# Patient Record
Sex: FEMALE | Race: ASIAN | URBAN - METROPOLITAN AREA
[De-identification: names, ages, dates, MRNs, and addresses within clinical notes are randomized per-mention and may not be internally consistent; named-entity substitution may affect disease eponyms.]

---

## 2018-02-06 ENCOUNTER — OFFICE VISIT (OUTPATIENT)
Dept: URGENT CARE | Facility: URGENT CARE | Age: 78
End: 2018-02-06
Payer: MEDICARE

## 2018-02-06 VITALS
OXYGEN SATURATION: 97 % | SYSTOLIC BLOOD PRESSURE: 130 MMHG | HEART RATE: 69 BPM | DIASTOLIC BLOOD PRESSURE: 68 MMHG | WEIGHT: 121.44 LBS | TEMPERATURE: 97.1 F

## 2018-02-06 DIAGNOSIS — J06.9 VIRAL URI WITH COUGH: Primary | ICD-10-CM

## 2018-02-06 PROCEDURE — 99203 OFFICE O/P NEW LOW 30 MIN: CPT | Performed by: PHYSICIAN ASSISTANT

## 2018-02-06 RX ORDER — LORATADINE 10 MG/1
10 TABLET ORAL DAILY
COMMUNITY

## 2018-02-06 RX ORDER — ESTRADIOL 1 MG/1
1 TABLET ORAL DAILY
COMMUNITY

## 2018-02-06 RX ORDER — CARVEDILOL 25 MG/1
25 TABLET ORAL 2 TIMES DAILY WITH MEALS
COMMUNITY

## 2018-02-06 RX ORDER — HYDROCHLOROTHIAZIDE 12.5 MG/1
25 TABLET ORAL DAILY
COMMUNITY

## 2018-02-06 RX ORDER — DEXLANSOPRAZOLE 60 MG/1
60 CAPSULE, DELAYED RELEASE ORAL DAILY
COMMUNITY

## 2018-02-06 RX ORDER — NITROGLYCERIN 0.4 MG/1
0.4 TABLET SUBLINGUAL EVERY 5 MIN PRN
COMMUNITY

## 2018-02-06 RX ORDER — LEVOTHYROXINE SODIUM 75 UG/1
75 TABLET ORAL DAILY
COMMUNITY

## 2018-02-06 RX ORDER — CODEINE PHOSPHATE AND GUAIFENESIN 10; 100 MG/5ML; MG/5ML
1-2 SOLUTION ORAL EVERY 4 HOURS PRN
Qty: 240 ML | Refills: 0 | Status: SHIPPED | OUTPATIENT
Start: 2018-02-06

## 2018-02-06 RX ORDER — AMLODIPINE BESYLATE 5 MG/1
5 TABLET ORAL DAILY
COMMUNITY

## 2018-02-06 NOTE — MR AVS SNAPSHOT
"              After Visit Summary   2/6/2018    Luz De Souza    MRN: [id number]           Patient Information     Date Of Birth          1940        Visit Information        Provider Department      2/6/2018 3:00 PM Corry Hunt PA-C [facility]        Today's Diagnoses     Viral URI with cough    -  1      Care Instructions    (J06.9,  B97.89) Viral URI with cough  (primary encounter diagnosis)  Comment:   Plan: guaiFENesin-codeine (ROBITUSSIN AC) 100-10         MG/5ML SOLN solution          Rest  Saline nasal spray  Follow up should symptoms persist or worsen.                Follow-ups after your visit        Who to contact     If you have questions or need follow up information about today's clinic visit or your schedule please contact [facility] directly at [phone].  Normal or non-critical lab and imaging results will be communicated to you by MyChart, letter or phone within 4 business days after the clinic has received the results. If you do not hear from us within 7 days, please contact the clinic through MyChart or phone. If you have a critical or abnormal lab result, we will notify you by phone as soon as possible.  Submit refill requests through [facility] or call your pharmacy and they will forward the refill request to us. Please allow 3 business days for your refill to be completed.          Additional Information About Your Visit        MyChart Information     [facility] lets you send messages to your doctor, view your test results, renew your prescriptions, schedule appointments and more. To sign up, go to [email or web address] . Click on \"Log in\" on the left side of the screen, which will take you to the Welcome page. Then click on \"Sign up Now\" on the right side of the page.     You will be asked to enter the access code listed below, as well as some personal information. Please follow the directions to create your username " and password.     Your access code is: [email or web address]  Expires: 2018  3:36 PM     Your access code will  in 90 days. If you need help or a new code, please call your [facility] clinic or [phone].        Care EveryWhere ID     This is your Care EveryWhere ID. This could be used by other organizations to access your [facility] medical records  [id number]        Your Vitals Were     Pulse Temperature Pulse Oximetry             69 97.1  F (36.2  C) (Oral) 97%          Blood Pressure from Last 3 Encounters:   18 130/68    Weight from Last 3 Encounters:   18 121 lb 7 oz (55.1 kg)              Today, you had the following     No orders found for display         Today's Medication Changes          These changes are accurate as of 18  3:36 PM.  If you have any questions, ask your nurse or doctor.               Start taking these medicines.        Dose/Directions    guaiFENesin-codeine 100-10 MG/5ML Soln solution   Commonly known as:  ROBITUSSIN AC   Used for:  Viral URI with cough   Started by:  Corry Hunt PA-C        Dose:  1-2 tsp.   Take 5-10 mLs by mouth every 4 hours as needed for cough   Quantity:  240 mL   Refills:  0            Where to get your medicines      Some of these will need a paper prescription and others can be bought over the counter.  Ask your nurse if you have questions.     Bring a paper prescription for each of these medications     guaiFENesin-codeine 100-10 MG/5ML Soln solution                Primary Care Provider Fax #    Physician No Ref-Primary [phone]       No address on file        Equal Access to Services     [facility]: Hadii hilton batista Soamie, waaxda luqadaha, qaybta kaalmada anthony, nila mak. So [facility] [phone].    ATENCIÓN: Si habla español, tiene a mandujano disposición servicios gratuitos de asistencia lingüística. Llame al [phone].    We comply with applicable federal civil rights laws and  [place] laws. We do not discriminate on the basis of race, color, national origin, age, disability, sex, sexual orientation, or gender identity.            Thank you!     Thank you for choosing [facility]  for your care. Our goal is always to provide you with excellent care. Hearing back from our patients is one way we can continue to improve our services. Please take a few minutes to complete the written survey that you may receive in the mail after your visit with us. Thank you!             Your Updated Medication List - Protect others around you: Learn how to safely use, store and throw away your medicines at www.disposemymeds.org.          This list is accurate as of 2/6/18  3:36 PM.  Always use your most recent med list.                   Brand Name Dispense Instructions for use Diagnosis    ACID RELIEF PO      Take 1 tablet by mouth daily        amLODIPine 5 MG tablet    NORVASC     Take 5 mg by mouth daily        ATORVASTATIN CALCIUM PO      Take by mouth daily        CALCIUM PO      Take 1 tablet by mouth daily        carvedilol 25 MG tablet    COREG     Take 25 mg by mouth 2 times daily (with meals)        DEXILANT 60 MG Cpdr CR capsule   Generic drug:  dexlansoprazole      Take 60 mg by mouth daily        ELIQUIS 5 MG tablet   Generic drug:  apixaban ANTICOAGULANT      Take 5 mg by mouth 2 times daily        estradiol 1 MG tablet    ESTRACE     Take 1 mg by mouth daily        guaiFENesin-codeine 100-10 MG/5ML Soln solution    ROBITUSSIN AC    240 mL    Take 5-10 mLs by mouth every 4 hours as needed for cough    Viral URI with cough       HAIR SKIN NAILS PO      Take 1 tablet by mouth daily        hydrochlorothiazide 12.5 MG Tabs tablet      Take 25 mg by mouth daily        levothyroxine 75 MCG tablet    SYNTHROID/LEVOTHROID     Take 75 mcg by mouth daily        loratadine 10 MG tablet    CLARITIN     Take 10 mg by mouth daily        nitroGLYcerin 0.4 MG sublingual tablet     NITROSTAT     Place 0.4 mg under the tongue every 5 minutes as needed for chest pain For chest pain place 1 tablet under the tongue every 5 minutes for 3 doses. If symptoms persist 5 minutes after 1st dose call 911.        TRIPLE FLEX PO      Take 1 tablet by mouth daily        VITAMIN C PO      Take by mouth daily        VITAMIN D (CHOLECALCIFEROL) PO      Take by mouth daily        VITAMIN E PO      Take 1 tablet by mouth daily

## 2018-02-06 NOTE — PATIENT INSTRUCTIONS
(J06.9,  B97.89) Viral URI with cough  (primary encounter diagnosis)  Comment:   Plan: guaiFENesin-codeine (ROBITUSSIN AC) 100-10         MG/5ML SOLN solution          Rest  Saline nasal spray  Follow up should symptoms persist or worsen.

## 2018-02-06 NOTE — PROGRESS NOTES
SUBJECTIVE:   Luz De Souza is a 77 year old female presenting with a chief complaint of:  1) runny nose and congestion  2) productive cough for 2 days.     Onset of symptoms was 2 day(s) ago.  Course of illness is worsening.    Severity mild  Current and Associated symptoms: as above.  Denies any chest discomfort or wheezing or shortness of breath.      She forgot her cough medicine with codeine at home and would like a prescription.      She is here with her daughter today.      Treatment measures tried include OTC Cough med.  Predisposing factors include None.    Also has fluticasone that she uses.      SH: here in [place] visiting family.  Travels back to [place] on 2/20/18    No past medical history on file.     HTN  CABG x 3 2002  Hypercholesterolemia  Hypothyroidsim  GERD  Allergic Rhinitis    There is no problem list on file for this patient.    Social History   Substance Use Topics     Smoking status: Former Smoker     Types: Cigarettes     Smokeless tobacco: Never Used     Alcohol use Not on file       ROS:  CONSTITUTIONAL:NEGATIVE for fever, chills, change in weight  INTEGUMENTARY/SKIN: NEGATIVE for worrisome rashes, moles or lesions  EYES: NEGATIVE for vision changes or irritation  ENT/MOUTH: as per HPI  RESP:as per HPI  CV: NEGATIVE for chest pain, palpitations or peripheral edema  GI: NEGATIVE for nausea, abdominal pain, heartburn, or change in bowel habits  MUSCULOSKELETAL: NEGATIVE for significant arthralgias or myalgia    OBJECTIVE  :/68  Pulse 69  Temp 97.1  F (36.2  C) (Oral)  Wt 121 lb 7 oz (55.1 kg)  SpO2 97%  GENERAL APPEARANCE: healthy, alert and no distress  EYES: EOMI,  PERRL, conjunctiva clear  HENT: ear canals and TM's normal.  Nose and mouth without ulcers, erythema or lesions  HENT: nasal turbinates boggy with bluish hue and rhinorrhea clear  NECK: supple, nontender, no lymphadenopathy  RESP: lungs clear to auscultation - no rales, rhonchi or wheezes  CV: regular rates and rhythm,  normal S1 S2, no murmur noted  ABDOMEN:  soft, nontender, no HSM or masses and bowel sounds normal  NEURO: Normal strength and tone, sensory exam grossly normal,  normal speech and mentation  SKIN: no suspicious lesions or rashes    (J06.9,  B97.89) Viral URI with cough  (primary encounter diagnosis)  Comment:   Plan: guaiFENesin-codeine (ROBITUSSIN AC) 100-10         MG/5ML SOLN solution     Counseled on precautions with codeine: causes drowsiness, potentially addicting.  Advised to use at bedtime only as needed.           Rest  Saline nasal spray  Follow up should symptoms persist or worsen.    Patient expresses understanding and agreement with the assessment and plan as above.